# Patient Record
Sex: MALE | Race: BLACK OR AFRICAN AMERICAN | NOT HISPANIC OR LATINO | Employment: FULL TIME | URBAN - METROPOLITAN AREA
[De-identification: names, ages, dates, MRNs, and addresses within clinical notes are randomized per-mention and may not be internally consistent; named-entity substitution may affect disease eponyms.]

---

## 2023-01-30 ENCOUNTER — OFFICE VISIT (OUTPATIENT)
Dept: URGENT CARE | Facility: PHYSICIAN GROUP | Age: 38
End: 2023-01-30
Payer: COMMERCIAL

## 2023-01-30 DIAGNOSIS — R81 GLUCOSURIA: ICD-10-CM

## 2023-01-30 DIAGNOSIS — R35.0 URINARY FREQUENCY: ICD-10-CM

## 2023-01-30 DIAGNOSIS — R73.9 ELEVATED BLOOD SUGAR: ICD-10-CM

## 2023-01-30 DIAGNOSIS — E11.9 TYPE 2 DIABETES MELLITUS WITHOUT COMPLICATION, WITHOUT LONG-TERM CURRENT USE OF INSULIN (HCC): Primary | ICD-10-CM

## 2023-01-30 LAB
APPEARANCE UR: NORMAL
BILIRUB UR STRIP-MCNC: NORMAL MG/DL
COLOR UR AUTO: NORMAL
GLUCOSE BLD-MCNC: 424 MG/DL (ref 65–99)
GLUCOSE UR STRIP.AUTO-MCNC: 1000 MG/DL
HBA1C MFR BLD: 13.2 % (ref ?–5.8)
KETONES UR STRIP.AUTO-MCNC: 40 MG/DL
LEUKOCYTE ESTERASE UR QL STRIP.AUTO: NEGATIVE
NITRITE UR QL STRIP.AUTO: NEGATIVE
PH UR STRIP.AUTO: 5 [PH] (ref 5–8)
POCT INT CON NEG: NEGATIVE
POCT INT CON POS: POSITIVE
PROT UR QL STRIP: NEGATIVE MG/DL
RBC UR QL AUTO: NORMAL
SP GR UR STRIP.AUTO: 1.01
UROBILINOGEN UR STRIP-MCNC: 0.2 MG/DL

## 2023-01-30 PROCEDURE — 99204 OFFICE O/P NEW MOD 45 MIN: CPT | Performed by: NURSE PRACTITIONER

## 2023-01-30 PROCEDURE — 83036 HEMOGLOBIN GLYCOSYLATED A1C: CPT | Performed by: NURSE PRACTITIONER

## 2023-01-30 PROCEDURE — 82962 GLUCOSE BLOOD TEST: CPT | Performed by: NURSE PRACTITIONER

## 2023-01-30 PROCEDURE — 81002 URINALYSIS NONAUTO W/O SCOPE: CPT | Performed by: NURSE PRACTITIONER

## 2023-01-31 NOTE — PROGRESS NOTES
services were used for this visit.    Patient has consented to treatment and for use of patient information for treatment and billing purposes.    Date: 01/30/23     Arrival Mode: Private Vehicle    Chief Complaint:    Chief Complaint   Patient presents with    Urinary Frequency     Freq urination, x1 month         History of Present Illness: 37 y.o.  male presents to clinic with one month of increased urination. He denies any burning with urination, flank pain, or the possibility of sti/std. He denies nausea, and pain, vomiting or diarrhea. He denies numbness or tingling in his hands or feet, vision changes, lethargy, or shortness of breat. Denies shortness of breath chest pain or lower leg swelling.       ROS:    As stated in HPI     Pertinent Medical History:  History reviewed. No pertinent past medical history.     Pertinent Surgical History:  History reviewed. No pertinent surgical history.     Pertinent Medications:    No current outpatient medications on file prior to visit.     No current facility-administered medications on file prior to visit.        Allergies:    Patient has no known allergies.     Social History:  Social History     Tobacco Use    Smoking status: Never    Smokeless tobacco: Never        No LMP for male patient.           Physical Exam:    Vitals:    01/30/23 1642   BP: 130/82   Pulse: 90   Resp: 16   Temp: 37.2 °C (98.9 °F)   SpO2: 99%             Physical Exam  Constitutional:       General: He is awake.      Appearance: Normal appearance. He is not ill-appearing, toxic-appearing or diaphoretic.   HENT:      Head: Normocephalic and atraumatic.      Right Ear: Tympanic membrane, ear canal and external ear normal.      Left Ear: Tympanic membrane, ear canal and external ear normal.   Eyes:      General: Lids are normal. Gaze aligned appropriately. No allergic shiner or scleral icterus.     Extraocular Movements: Extraocular movements intact.      Conjunctiva/sclera:  Conjunctivae normal.      Pupils: Pupils are equal, round, and reactive to light.   Cardiovascular:      Rate and Rhythm: Normal rate and regular rhythm.      Pulses:           Radial pulses are 2+ on the right side and 2+ on the left side.      Heart sounds: Normal heart sounds.   Pulmonary:      Effort: Pulmonary effort is normal.      Breath sounds: Normal breath sounds and air entry. No decreased breath sounds, wheezing, rhonchi or rales.   Abdominal:      General: Abdomen is flat. Bowel sounds are normal.      Palpations: Abdomen is soft.      Tenderness: There is no abdominal tenderness. There is no right CVA tenderness or left CVA tenderness.   Musculoskeletal:      Right lower leg: No edema.      Left lower leg: No edema.   Lymphadenopathy:      Cervical: No cervical adenopathy.   Skin:     General: Skin is warm.      Capillary Refill: Capillary refill takes less than 2 seconds.      Coloration: Skin is not cyanotic or pale.   Neurological:      Mental Status: He is alert and oriented to person, place, and time.      Gait: Gait is intact.   Psychiatric:         Behavior: Behavior normal. Behavior is cooperative.          Diagnostics:      Results for orders placed or performed in visit on 01/30/23   POCT Urinalysis   Result Value Ref Range    POC Color light yellow Negative    POC Appearance cloudy Negative    POC Leukocyte Esterase negative Negative    POC Nitrites negative Negative    POC Urobiligen 0.2 Negative (0.2) mg/dL    POC Protein negative Negative mg/dL    POC Urine PH 5.0 5.0 - 8.0    POC Blood trace-intact Negative    POC Specific Gravity 1.010 <1.005 - >1.030    POC Ketones 40 Negative mg/dL    POC Bilirubin megative Negative mg/dL    POC Glucose 1,000 Negative mg/dL   POCT Glucose   Result Value Ref Range    Glucose - Accu-Ck 424 (A) 65 - 99 mg/dL   POCT  A1C   Result Value Ref Range    Glycohemoglobin 13.2 (A) 5.8 %    Internal Control Positive Positive     Internal Control Negative Negative         Diagnostics interpreted by myself.      Medical Decision making and clinic course :  I personally reviewed prior external notes and test results pertinent to today's visit.   Shared decision-making was utilized with patient for treatment plan. Urine has elevated glucose, fortunately, no ketones, hematuria or protein. Elevated poc finger stick and A1C. Findings consistent with DM type 2. Patient does not have a primary care provider.  Newly diagnosed diabetes mellitus type 2.  Low suspicion for diabetic ketoacidosis at this time as patient appears nontoxic on exam, he is well-hydrated, he is not tachycardic and appears generally well.  Will start patient on metformin 500 mg at night.  After 1 week patient will start taking metformin 500 mg 2 times daily.  We will place urgent referral for PCP as well as for diabetic education.  Did educate on diabetic diet as well as printed education for patient.    The patient remained stable during the urgent care visit.    Plan:    Medication discussed included indication for use and the potential  benefits and side effects.     1. Type 2 diabetes mellitus without complication, without long-term current use of insulin (HCC)    - metFORMIN (GLUCOPHAGE) 500 MG Tab; Take 1 Tablet by mouth every day for 7 days, THEN 1 Tablet 2 times a day with meals for 60 days.  Dispense: 127 Tablet; Refill: 0  - Referral to Diabetic Education    2. Urinary frequency    - POCT Urinalysis  - POCT Glucose  - Referral to establish with [facility] PCP    3. Glucosuria    - Referral to establish with [facility] PCP  - POCT  A1C  - Referral to Diabetic Education    4. Elevated blood sugar    - Referral to establish with [facility] PCP  - POCT  A1C  - Referral to Diabetic Education       All of the patient's questions were answered to their satisfaction at the time of discharge.    Follow up:    Recommended f/u with pcp as referred     Patient was encouraged to monitor symptoms closely. Those signs and symptoms  which would warrant concern and mandate seeking a higher level of service through the emergency department discussed at length and included in discharge papers.  Patient stated agreement and understanding of this plan of care.    Disposition:  Home in stable condition     Spent 45 min educating pt, reviewing lab results with patient and documenting.     Voice Recognition Disclaimer:  Portions of this document were created using voice recognition software. The software does have a chance of producing errors of grammar and possibly content. I have made every reasonable attempt to correct obvious errors, but there may be errors of grammar and possibly content that I did not discover before finalizing the documentation.    DALILA Jo.

## 2023-02-01 VITALS
TEMPERATURE: 98.9 F | OXYGEN SATURATION: 99 % | SYSTOLIC BLOOD PRESSURE: 130 MMHG | HEART RATE: 90 BPM | HEIGHT: 70 IN | DIASTOLIC BLOOD PRESSURE: 82 MMHG | RESPIRATION RATE: 16 BRPM | WEIGHT: 220 LBS | BODY MASS INDEX: 31.5 KG/M2

## 2023-02-08 ENCOUNTER — TELEPHONE (OUTPATIENT)
Dept: HEALTH INFORMATION MANAGEMENT | Facility: OTHER | Age: 38
End: 2023-02-08

## 2023-02-08 NOTE — TELEPHONE ENCOUNTER
OUTCOME:  CALLED PT TO SET UP APPT WITH A PCP. PT WILL CALL BACK AT A LATER TIME.     PLEASE TRANSFER TO [facility] [phone] WHEN PT RETURNS CALL.     ATTEMPT # 1

## 2024-01-04 ENCOUNTER — OFFICE VISIT (OUTPATIENT)
Dept: URGENT CARE | Facility: PHYSICIAN GROUP | Age: 39
End: 2024-01-04
Payer: COMMERCIAL

## 2024-01-04 VITALS
TEMPERATURE: 98.2 F | SYSTOLIC BLOOD PRESSURE: 126 MMHG | RESPIRATION RATE: 16 BRPM | WEIGHT: 199 LBS | DIASTOLIC BLOOD PRESSURE: 80 MMHG | HEIGHT: 70 IN | OXYGEN SATURATION: 98 % | HEART RATE: 95 BPM | BODY MASS INDEX: 28.49 KG/M2

## 2024-01-04 DIAGNOSIS — R35.0 URINARY FREQUENCY: ICD-10-CM

## 2024-01-04 DIAGNOSIS — E11.9 TYPE 2 DIABETES MELLITUS WITHOUT COMPLICATION, WITHOUT LONG-TERM CURRENT USE OF INSULIN (HCC): ICD-10-CM

## 2024-01-04 LAB — GLUCOSE BLD-MCNC: 320 MG/DL (ref 65–99)

## 2024-01-04 PROCEDURE — 3074F SYST BP LT 130 MM HG: CPT | Performed by: REGISTERED NURSE

## 2024-01-04 PROCEDURE — 82962 GLUCOSE BLOOD TEST: CPT | Performed by: REGISTERED NURSE

## 2024-01-04 PROCEDURE — 99213 OFFICE O/P EST LOW 20 MIN: CPT | Performed by: REGISTERED NURSE

## 2024-01-04 PROCEDURE — 3079F DIAST BP 80-89 MM HG: CPT | Performed by: REGISTERED NURSE

## 2024-01-04 ASSESSMENT — ENCOUNTER SYMPTOMS
SHORTNESS OF BREATH: 0
FLANK PAIN: 0
HEADACHES: 0
VOMITING: 0
DIZZINESS: 0
CHILLS: 0
FEVER: 0
ABDOMINAL PAIN: 0

## 2024-01-05 NOTE — PROGRESS NOTES
"Subjective:   Carlos A Pinon is a 38 y.o. male who presents for Medication Refill (Metformin refill) and Bladder Problem (Urinating frequently x 3 months )    HPI  History of type 2 diabetes diagnosed at a visit in January 2023 in urgent care.  Was prescribed metformin at that time.  Medication ran out 2 months ago so is not been taking it.  Since this medication ran out has had increased urinary frequency but no dysuria.  No polydipsia or polyphagia.  No back pain.  No fever chills body aches.    Review of Systems   Constitutional:  Negative for chills and fever.   Respiratory:  Negative for shortness of breath.    Cardiovascular:  Negative for chest pain.   Gastrointestinal:  Negative for abdominal pain and vomiting.   Genitourinary:  Positive for frequency. Negative for dysuria, flank pain, hematuria and urgency.   Skin:  Negative for rash.   Neurological:  Negative for dizziness and headaches.       Medications, Allergies, and current problem list reviewed today in Epic.     Objective:     /80 (BP Location: Left arm, Patient Position: Sitting, BP Cuff Size: Adult)   Pulse 95   Temp 36.8 °C (98.2 °F) (Temporal)   Resp 16   Ht 1.778 m (5' 10\")   Wt 90.3 kg (199 lb)   SpO2 98%     Physical Exam  Vitals and nursing note reviewed.   Constitutional:       Appearance: Normal appearance. He is not ill-appearing or toxic-appearing.   HENT:      Mouth/Throat:      Mouth: Mucous membranes are moist.   Eyes:      Pupils: Pupils are equal, round, and reactive to light.   Cardiovascular:      Rate and Rhythm: Normal rate and regular rhythm.      Heart sounds: No murmur heard.  Pulmonary:      Effort: Pulmonary effort is normal. No respiratory distress.      Breath sounds: Normal breath sounds.   Abdominal:      General: Abdomen is flat.      Palpations: Abdomen is soft.      Tenderness: There is no abdominal tenderness. There is no right CVA tenderness or left CVA tenderness.   Musculoskeletal:         General: " Normal range of motion.      Cervical back: Normal range of motion.   Skin:     General: Skin is warm and dry.      Capillary Refill: Capillary refill takes less than 2 seconds.      Findings: No rash.   Neurological:      General: No focal deficit present.      Mental Status: He is alert and oriented to person, place, and time.      Cranial Nerves: No cranial nerve deficit.      Sensory: No sensory deficit.      Gait: Gait normal.   Psychiatric:         Mood and Affect: Mood normal.         Lab Results/POC Test Results   Results for orders placed or performed in visit on 01/04/24   POCT Glucose   Result Value Ref Range    Glucose - Accu-Ck 320 (A) 65 - 99 mg/dL           Assessment/Plan:     Differential diagnosis discussed     1. Type 2 diabetes mellitus without complication, without long-term current use of insulin ([facility])  metFORMIN (GLUCOPHAGE) 500 MG Tab    POCT Glucose    HEMOGLOBIN A1C      2. Urinary frequency  metFORMIN (GLUCOPHAGE) 500 MG Tab    POCT Glucose    HEMOGLOBIN A1C        Diagnosed type 2 diabetes had a visit roughly 1 year ago.  Ran out of medication 2 months ago since then has increased urinary frequency.  No other symptoms.  In clinic POC glucose is 320.  Patient is neurologically intact and not diffuse.  Reviewed this with him we will refill his metformin discussed importance of monitoring blood sugar.  Referral placed for him to establish with primary care for better management of this condition.  Signs symptoms require immediate attention discussed with patient    Return to clinic or go to ED if symptoms worsen or persist. Indications for ED discussed at length. Red flag symptoms discussed. All side effects of medication discussed including allergic response, GI upset, tendon injury, rash, sedation etc. Patient and/or guardian voices understanding.     I personally reviewed prior external notes and test results pertinent to today's visit as well as additional imaging and testing completed  in clinic today.     Please note that this dictation was created using voice recognition software. I have made every reasonable attempt to correct obvious errors, but I expect that there are errors of grammar and possibly content that I did not discover before finalizing the note.    This note was electronically signed by EDOUARD Serna

## 2024-01-19 ENCOUNTER — TELEPHONE (OUTPATIENT)
Dept: HEALTH INFORMATION MANAGEMENT | Facility: OTHER | Age: 39
End: 2024-01-19

## 2024-07-11 ENCOUNTER — OFFICE VISIT (OUTPATIENT)
Dept: URGENT CARE | Facility: PHYSICIAN GROUP | Age: 39
End: 2024-07-11
Payer: COMMERCIAL

## 2024-07-11 VITALS
TEMPERATURE: 97.1 F | BODY MASS INDEX: 29.4 KG/M2 | OXYGEN SATURATION: 98 % | DIASTOLIC BLOOD PRESSURE: 68 MMHG | HEIGHT: 68 IN | HEART RATE: 98 BPM | WEIGHT: 194 LBS | SYSTOLIC BLOOD PRESSURE: 108 MMHG

## 2024-07-11 DIAGNOSIS — E11.9 TYPE 2 DIABETES MELLITUS WITHOUT COMPLICATION, WITHOUT LONG-TERM CURRENT USE OF INSULIN (HCC): ICD-10-CM

## 2024-07-11 PROCEDURE — 3074F SYST BP LT 130 MM HG: CPT | Performed by: NURSE PRACTITIONER

## 2024-07-11 PROCEDURE — 3078F DIAST BP <80 MM HG: CPT | Performed by: NURSE PRACTITIONER

## 2024-07-11 PROCEDURE — 99214 OFFICE O/P EST MOD 30 MIN: CPT | Performed by: NURSE PRACTITIONER

## 2024-07-11 ASSESSMENT — ENCOUNTER SYMPTOMS
CONSTITUTIONAL NEGATIVE: 1
RESPIRATORY NEGATIVE: 1
NEUROLOGICAL NEGATIVE: 1
CARDIOVASCULAR NEGATIVE: 1
BLURRED VISION: 0
POLYDIPSIA: 0

## 2024-07-11 ASSESSMENT — VISUAL ACUITY: OU: 1

## 2024-07-12 ENCOUNTER — HOSPITAL ENCOUNTER (OUTPATIENT)
Dept: LAB | Facility: MEDICAL CENTER | Age: 39
End: 2024-07-12
Attending: NURSE PRACTITIONER
Payer: COMMERCIAL

## 2024-07-12 DIAGNOSIS — E11.9 TYPE 2 DIABETES MELLITUS WITHOUT COMPLICATION, WITHOUT LONG-TERM CURRENT USE OF INSULIN (HCC): ICD-10-CM

## 2024-07-12 LAB
ALBUMIN SERPL BCP-MCNC: 4.1 G/DL (ref 3.2–4.9)
ALBUMIN/GLOB SERPL: 1.5 G/DL
ALP SERPL-CCNC: 82 U/L (ref 30–99)
ALT SERPL-CCNC: 12 U/L (ref 2–50)
ANION GAP SERPL CALC-SCNC: 10 MMOL/L (ref 7–16)
AST SERPL-CCNC: 8 U/L (ref 12–45)
BASOPHILS # BLD AUTO: 0.2 % (ref 0–1.8)
BASOPHILS # BLD: 0.01 K/UL (ref 0–0.12)
BILIRUB SERPL-MCNC: 0.7 MG/DL (ref 0.1–1.5)
BUN SERPL-MCNC: 14 MG/DL (ref 8–22)
CALCIUM ALBUM COR SERPL-MCNC: 8.7 MG/DL (ref 8.5–10.5)
CALCIUM SERPL-MCNC: 8.8 MG/DL (ref 8.5–10.5)
CHLORIDE SERPL-SCNC: 94 MMOL/L (ref 96–112)
CO2 SERPL-SCNC: 25 MMOL/L (ref 20–33)
CREAT SERPL-MCNC: 0.95 MG/DL (ref 0.5–1.4)
EOSINOPHIL # BLD AUTO: 0.01 K/UL (ref 0–0.51)
EOSINOPHIL NFR BLD: 0.2 % (ref 0–6.9)
ERYTHROCYTE [DISTWIDTH] IN BLOOD BY AUTOMATED COUNT: 36.9 FL (ref 35.9–50)
EST. AVERAGE GLUCOSE BLD GHB EST-MCNC: 367 MG/DL
GFR SERPLBLD CREATININE-BSD FMLA CKD-EPI: 103 ML/MIN/1.73 M 2
GLOBULIN SER CALC-MCNC: 2.8 G/DL (ref 1.9–3.5)
GLUCOSE SERPL-MCNC: 590 MG/DL (ref 65–99)
HBA1C MFR BLD: 14.4 % (ref 4–5.6)
HCT VFR BLD AUTO: 42.1 % (ref 42–52)
HGB BLD-MCNC: 14.7 G/DL (ref 14–18)
IMM GRANULOCYTES # BLD AUTO: 0.01 K/UL (ref 0–0.11)
IMM GRANULOCYTES NFR BLD AUTO: 0.2 % (ref 0–0.9)
LYMPHOCYTES # BLD AUTO: 1.88 K/UL (ref 1–4.8)
LYMPHOCYTES NFR BLD: 39.3 % (ref 22–41)
MCH RBC QN AUTO: 27.4 PG (ref 27–33)
MCHC RBC AUTO-ENTMCNC: 34.9 G/DL (ref 32.3–36.5)
MCV RBC AUTO: 78.4 FL (ref 81.4–97.8)
MONOCYTES # BLD AUTO: 0.36 K/UL (ref 0–0.85)
MONOCYTES NFR BLD AUTO: 7.5 % (ref 0–13.4)
NEUTROPHILS # BLD AUTO: 2.51 K/UL (ref 1.82–7.42)
NEUTROPHILS NFR BLD: 52.6 % (ref 44–72)
NRBC # BLD AUTO: 0 K/UL
NRBC BLD-RTO: 0 /100 WBC (ref 0–0.2)
PLATELET # BLD AUTO: 305 K/UL (ref 164–446)
PMV BLD AUTO: 10.1 FL (ref 9–12.9)
POTASSIUM SERPL-SCNC: 4.7 MMOL/L (ref 3.6–5.5)
PROT SERPL-MCNC: 6.9 G/DL (ref 6–8.2)
RBC # BLD AUTO: 5.37 M/UL (ref 4.7–6.1)
SODIUM SERPL-SCNC: 129 MMOL/L (ref 135–145)
WBC # BLD AUTO: 4.8 K/UL (ref 4.8–10.8)

## 2024-07-12 PROCEDURE — 36415 COLL VENOUS BLD VENIPUNCTURE: CPT

## 2024-07-12 PROCEDURE — 83036 HEMOGLOBIN GLYCOSYLATED A1C: CPT

## 2024-07-12 PROCEDURE — 80053 COMPREHEN METABOLIC PANEL: CPT

## 2024-07-12 PROCEDURE — 85025 COMPLETE CBC W/AUTO DIFF WBC: CPT

## 2024-12-04 ENCOUNTER — OFFICE VISIT (OUTPATIENT)
Dept: URGENT CARE | Facility: PHYSICIAN GROUP | Age: 39
End: 2024-12-04
Payer: COMMERCIAL

## 2024-12-04 VITALS
WEIGHT: 190.7 LBS | HEART RATE: 93 BPM | HEIGHT: 68 IN | OXYGEN SATURATION: 99 % | DIASTOLIC BLOOD PRESSURE: 82 MMHG | BODY MASS INDEX: 28.9 KG/M2 | TEMPERATURE: 96.9 F | RESPIRATION RATE: 15 BRPM | SYSTOLIC BLOOD PRESSURE: 118 MMHG

## 2024-12-04 DIAGNOSIS — E11.9 TYPE 2 DIABETES MELLITUS WITHOUT COMPLICATION, WITHOUT LONG-TERM CURRENT USE OF INSULIN (HCC): ICD-10-CM

## 2024-12-04 LAB — GLUCOSE BLD-MCNC: >600 MG/DL (ref 65–99)

## 2024-12-04 PROCEDURE — 82962 GLUCOSE BLOOD TEST: CPT | Performed by: FAMILY MEDICINE

## 2024-12-04 PROCEDURE — 99214 OFFICE O/P EST MOD 30 MIN: CPT | Performed by: FAMILY MEDICINE

## 2024-12-04 PROCEDURE — 3074F SYST BP LT 130 MM HG: CPT | Performed by: FAMILY MEDICINE

## 2024-12-04 PROCEDURE — 3079F DIAST BP 80-89 MM HG: CPT | Performed by: FAMILY MEDICINE

## 2024-12-04 ASSESSMENT — ENCOUNTER SYMPTOMS
EYE REDNESS: 0
EYE DISCHARGE: 0
MYALGIAS: 0
WEIGHT LOSS: 0
NAUSEA: 0
VOMITING: 0

## 2024-12-04 ASSESSMENT — FIBROSIS 4 INDEX: FIB4 SCORE: 0.31

## 2024-12-05 NOTE — PROGRESS NOTES
"Subjective     Carlos A Pinon is a 39 y.o. male who presents with Medication Refill (Metformin 1000mg)            Carlos A had multiple urgent care visits for poorly controlled type 2 diabetes.  He has been unable to follow-up with primary care due to his job however he notes this will be changing at the end of this month.  He has been out of metformin for a few weeks.  Currently experiencing polydipsia and polyuria.  No mental status change.  No other aggravating or alleviating factors.        Review of Systems   Constitutional:  Negative for malaise/fatigue and weight loss.   Eyes:  Negative for discharge and redness.   Gastrointestinal:  Negative for nausea and vomiting.   Musculoskeletal:  Negative for joint pain and myalgias.   Skin:  Negative for itching and rash.              Objective     /82 (BP Location: Right arm, Patient Position: Sitting, BP Cuff Size: Adult long)   Pulse 93   Temp 36.1 °C (96.9 °F) (Temporal)   Resp 15   Ht 1.735 m (5' 8.3\")   Wt 86.5 kg (190 lb 11.2 oz)   SpO2 99%   BMI 28.74 kg/m²      Physical Exam  Constitutional:       General: He is not in acute distress.     Appearance: He is well-developed.   HENT:      Head: Normocephalic and atraumatic.      Mouth/Throat:      Mouth: Mucous membranes are moist.   Eyes:      Conjunctiva/sclera: Conjunctivae normal.   Cardiovascular:      Rate and Rhythm: Normal rate and regular rhythm.      Heart sounds: Normal heart sounds. No murmur heard.  Pulmonary:      Effort: Pulmonary effort is normal.      Breath sounds: Normal breath sounds. No wheezing.   Skin:     General: Skin is warm and dry.      Findings: No rash.   Neurological:      Mental Status: He is alert.                             Assessment & Plan      POCT glucose >600    CBC, CMP, and hemoglobin A1c 7/12/2024 reviewed  Hemoglobin A1c 1/4/2024 reviewed    Urgent care visit 7/11/2024 and 1/4/2024 reviewed  Assessment & Plan  Type 2 diabetes mellitus without complication, " without long-term current use of insulin ([facility])    Orders:    POCT Glucose    Referral to establish with PCP    Referral to Pharmacotherapy Service    metformin (GLUCOPHAGE) 1000 MG tablet; Take 1 Tablet by mouth 2 times a day with meals for 90 days.    Carlos A looks remarkably well for such a high blood sugar.  No clinical evidence of mental status change due to hyperosmolar state.  Will refill his metformin and refer again to primary care and pharmacotherapy.

## 2025-01-10 ENCOUNTER — TELEPHONE (OUTPATIENT)
Dept: HEALTH INFORMATION MANAGEMENT | Facility: OTHER | Age: 40
End: 2025-01-10
Payer: COMMERCIAL

## 2025-04-13 ENCOUNTER — HOSPITAL ENCOUNTER (EMERGENCY)
Facility: MEDICAL CENTER | Age: 40
End: 2025-04-13
Attending: STUDENT IN AN ORGANIZED HEALTH CARE EDUCATION/TRAINING PROGRAM
Payer: COMMERCIAL

## 2025-04-13 VITALS
DIASTOLIC BLOOD PRESSURE: 95 MMHG | HEART RATE: 95 BPM | SYSTOLIC BLOOD PRESSURE: 124 MMHG | OXYGEN SATURATION: 100 % | TEMPERATURE: 96.6 F | BODY MASS INDEX: 28.2 KG/M2 | HEIGHT: 68 IN | RESPIRATION RATE: 16 BRPM | WEIGHT: 186.07 LBS

## 2025-04-13 DIAGNOSIS — E11.65 TYPE 2 DIABETES MELLITUS WITH HYPERGLYCEMIA, WITHOUT LONG-TERM CURRENT USE OF INSULIN (HCC): ICD-10-CM

## 2025-04-13 DIAGNOSIS — Z76.0 MEDICATION REFILL: ICD-10-CM

## 2025-04-13 DIAGNOSIS — R73.9 HYPERGLYCEMIA: Primary | ICD-10-CM

## 2025-04-13 LAB
ANION GAP SERPL CALC-SCNC: 13 MMOL/L (ref 7–16)
APPEARANCE UR: CLEAR
BASE EXCESS BLDV CALC-SCNC: 2 MMOL/L (ref -2–3)
BASOPHILS # BLD AUTO: 0.3 % (ref 0–1.8)
BASOPHILS # BLD: 0.02 K/UL (ref 0–0.12)
BILIRUB UR QL STRIP.AUTO: NEGATIVE
BODY TEMPERATURE: 37.1 CENTIGRADE
BUN SERPL-MCNC: 15 MG/DL (ref 8–22)
CALCIUM SERPL-MCNC: 9.8 MG/DL (ref 8.5–10.5)
CHLORIDE SERPL-SCNC: 89 MMOL/L (ref 96–112)
CO2 SERPL-SCNC: 25 MMOL/L (ref 20–33)
COLOR UR: YELLOW
CREAT SERPL-MCNC: 1.17 MG/DL (ref 0.5–1.4)
EOSINOPHIL # BLD AUTO: 0.02 K/UL (ref 0–0.51)
EOSINOPHIL NFR BLD: 0.3 % (ref 0–6.9)
ERYTHROCYTE [DISTWIDTH] IN BLOOD BY AUTOMATED COUNT: 34.9 FL (ref 35.9–50)
GFR SERPLBLD CREATININE-BSD FMLA CKD-EPI: 80 ML/MIN/1.73 M 2
GLUCOSE BLD STRIP.AUTO-MCNC: 366 MG/DL (ref 65–99)
GLUCOSE BLD STRIP.AUTO-MCNC: 373 MG/DL (ref 65–99)
GLUCOSE BLD STRIP.AUTO-MCNC: 407 MG/DL (ref 65–99)
GLUCOSE BLD STRIP.AUTO-MCNC: >600 MG/DL (ref 65–99)
GLUCOSE SERPL-MCNC: 600 MG/DL (ref 65–99)
GLUCOSE UR STRIP.AUTO-MCNC: >=1000 MG/DL
HCO3 BLDV-SCNC: 27 MMOL/L (ref 22–29)
HCT VFR BLD AUTO: 45.9 % (ref 42–52)
HGB BLD-MCNC: 16.5 G/DL (ref 14–18)
IMM GRANULOCYTES # BLD AUTO: 0.01 K/UL (ref 0–0.11)
IMM GRANULOCYTES NFR BLD AUTO: 0.2 % (ref 0–0.9)
KETONES UR STRIP.AUTO-MCNC: 15 MG/DL
LEUKOCYTE ESTERASE UR QL STRIP.AUTO: NEGATIVE
LYMPHOCYTES # BLD AUTO: 2.28 K/UL (ref 1–4.8)
LYMPHOCYTES NFR BLD: 36.9 % (ref 22–41)
MAGNESIUM SERPL-MCNC: 2.2 MG/DL (ref 1.5–2.5)
MCH RBC QN AUTO: 27.5 PG (ref 27–33)
MCHC RBC AUTO-ENTMCNC: 35.9 G/DL (ref 32.3–36.5)
MCV RBC AUTO: 76.4 FL (ref 81.4–97.8)
MICRO URNS: ABNORMAL
MONOCYTES # BLD AUTO: 0.42 K/UL (ref 0–0.85)
MONOCYTES NFR BLD AUTO: 6.8 % (ref 0–13.4)
NEUTROPHILS # BLD AUTO: 3.43 K/UL (ref 1.82–7.42)
NEUTROPHILS NFR BLD: 55.5 % (ref 44–72)
NITRITE UR QL STRIP.AUTO: NEGATIVE
NRBC # BLD AUTO: 0 K/UL
NRBC BLD-RTO: 0 /100 WBC (ref 0–0.2)
PCO2 BLDV: 48.2 MMHG (ref 38–54)
PCO2 TEMP ADJ BLDV: 48.2 MMHG
PH BLDV: 7.36 [PH] (ref 7.31–7.45)
PH TEMP ADJ BLDV: 7.36 [PH] (ref 7.31–7.45)
PH UR STRIP.AUTO: 6.5 [PH] (ref 5–8)
PLATELET # BLD AUTO: 365 K/UL (ref 164–446)
PMV BLD AUTO: 9.5 FL (ref 9–12.9)
PO2 BLDV: 38.2 MMHG (ref 23–48)
POTASSIUM SERPL-SCNC: 4.7 MMOL/L (ref 3.6–5.5)
PROT UR QL STRIP: NEGATIVE MG/DL
RBC # BLD AUTO: 6.01 M/UL (ref 4.7–6.1)
RBC UR QL AUTO: NEGATIVE
SAO2 % BLDV: 52 % (ref 60–85)
SODIUM SERPL-SCNC: 127 MMOL/L (ref 135–145)
SP GR UR STRIP.AUTO: >1.035
UROBILINOGEN UR STRIP.AUTO-MCNC: 0.2 EU/DL
WBC # BLD AUTO: 6.2 K/UL (ref 4.8–10.8)

## 2025-04-13 PROCEDURE — 96372 THER/PROPH/DIAG INJ SC/IM: CPT

## 2025-04-13 PROCEDURE — 81003 URINALYSIS AUTO W/O SCOPE: CPT

## 2025-04-13 PROCEDURE — 99284 EMERGENCY DEPT VISIT MOD MDM: CPT

## 2025-04-13 PROCEDURE — 700105 HCHG RX REV CODE 258: Performed by: STUDENT IN AN ORGANIZED HEALTH CARE EDUCATION/TRAINING PROGRAM

## 2025-04-13 PROCEDURE — 85025 COMPLETE CBC W/AUTO DIFF WBC: CPT

## 2025-04-13 PROCEDURE — 82803 BLOOD GASES ANY COMBINATION: CPT

## 2025-04-13 PROCEDURE — 82962 GLUCOSE BLOOD TEST: CPT

## 2025-04-13 PROCEDURE — 700102 HCHG RX REV CODE 250 W/ 637 OVERRIDE(OP): Performed by: STUDENT IN AN ORGANIZED HEALTH CARE EDUCATION/TRAINING PROGRAM

## 2025-04-13 PROCEDURE — 36415 COLL VENOUS BLD VENIPUNCTURE: CPT

## 2025-04-13 PROCEDURE — 80048 BASIC METABOLIC PNL TOTAL CA: CPT

## 2025-04-13 PROCEDURE — 83735 ASSAY OF MAGNESIUM: CPT

## 2025-04-13 RX ORDER — INSULIN LISPRO 100 [IU]/ML
5 INJECTION, SOLUTION INTRAVENOUS; SUBCUTANEOUS ONCE
Status: COMPLETED | OUTPATIENT
Start: 2025-04-13 | End: 2025-04-13

## 2025-04-13 RX ORDER — INSULIN LISPRO 100 [IU]/ML
8 INJECTION, SOLUTION INTRAVENOUS; SUBCUTANEOUS ONCE
Status: DISCONTINUED | OUTPATIENT
Start: 2025-04-13 | End: 2025-04-13

## 2025-04-13 RX ORDER — SODIUM CHLORIDE, SODIUM LACTATE, POTASSIUM CHLORIDE, AND CALCIUM CHLORIDE .6; .31; .03; .02 G/100ML; G/100ML; G/100ML; G/100ML
1000 INJECTION, SOLUTION INTRAVENOUS ONCE
Status: COMPLETED | OUTPATIENT
Start: 2025-04-13 | End: 2025-04-13

## 2025-04-13 RX ORDER — SODIUM CHLORIDE, SODIUM LACTATE, POTASSIUM CHLORIDE, CALCIUM CHLORIDE 600; 310; 30; 20 MG/100ML; MG/100ML; MG/100ML; MG/100ML
1000 INJECTION, SOLUTION INTRAVENOUS ONCE
Status: COMPLETED | OUTPATIENT
Start: 2025-04-13 | End: 2025-04-13

## 2025-04-13 RX ADMIN — SODIUM CHLORIDE, POTASSIUM CHLORIDE, SODIUM LACTATE AND CALCIUM CHLORIDE 1000 ML: 600; 310; 30; 20 INJECTION, SOLUTION INTRAVENOUS at 17:30

## 2025-04-13 RX ADMIN — SODIUM CHLORIDE, POTASSIUM CHLORIDE, SODIUM LACTATE AND CALCIUM CHLORIDE 1000 ML: 600; 310; 30; 20 INJECTION, SOLUTION INTRAVENOUS at 16:30

## 2025-04-13 RX ADMIN — INSULIN LISPRO 5 UNITS: 100 INJECTION, SOLUTION INTRAVENOUS; SUBCUTANEOUS at 18:27

## 2025-04-13 ASSESSMENT — FIBROSIS 4 INDEX: FIB4 SCORE: 0.31

## 2025-04-13 NOTE — ED NOTES
Medication history reviewed with pt. Med rec is complete.  Allergies reviewed, per pt    Pt reports no prescription medications, OTC's, or vitamins in the last 30 days or longer.    Patient has not had any outpatient antibiotics in the last 30 days.    Pt is not on any anticoagulants

## 2025-04-13 NOTE — ED PROVIDER NOTES
"ED Provider Note    CHIEF COMPLAINT  Chief Complaint   Patient presents with    High Blood Sugar     Read HI on the POCT glucose in triage. Patient states he has not taken his medication for around 20 days. Patient would like help controlling this.        EXTERNAL RECORDS REVIEWED  Outpatient Notes patient was diagnosed with diabetes in January 2023 at urgent care.  He was started on metformin.  He has been seen at urgent care 3 times since then for medication refill.  He does not have a primary care doctor    HPI/ROS  LIMITATION TO HISTORY   Select: : None  OUTSIDE HISTORIAN(S):  None    Carlos A Pinon is a 39 y.o. male who presents who states that he needs refill for metformin to 1000 mg twice daily.  Patient states that he has history of type 2 diabetes.  He does not regularly check his blood sugar at home.  He has not taken his metformin in the past 20 days as he is run out.  He denies any nausea, vomiting, chest pain, shortness of breath.  He does not have a primary care doctor.  On arrival, blood sugar was checked and it was over 600 in triage.    PAST MEDICAL HISTORY   He has history of diabetes    SURGICAL HISTORY  patient denies any surgical history    FAMILY HISTORY  No family history on file.    SOCIAL HISTORY  Social History     Tobacco Use    Smoking status: Never    Smokeless tobacco: Never   Vaping Use    Vaping status: Never Used   Substance and Sexual Activity    Alcohol use: Not on file    Drug use: Not on file    Sexual activity: Not on file       CURRENT MEDICATIONS  Home Medications       Reviewed by Christo Moran R.N. (Registered Nurse) on 04/13/25 at 1530  Med List Status: Not Addressed     Medication Last Dose Status        Patient Baljeet Taking any Medications                           ALLERGIES  No Known Allergies    PHYSICAL EXAM  VITAL SIGNS: BP (!) 144/101   Pulse 99   Temp 35.9 °C (96.6 °F) (Temporal)   Resp 18   Ht 1.727 m (5' 8\")   Wt 84.4 kg (186 lb 1.1 oz)   SpO2 93%   BMI " 28.29 kg/m²      Constitutional: Well developed, Well nourished, No acute respiratory distress, Non-toxic appearance.   HENT: Normocephalic, Atraumatic  Cardiovascular: Normal heart rate, Normal rhythm, No murmurs, No rubs, No gallops.   Thorax & Lungs: Clear to auscultation bilaterally, No respiratory distress, No wheezing.  Abdomen: Soft nontender  no rebound masses or peritoneal signs  Skin: Warm, Dry, No erythema, No rash.   Extremities: Intact distal pulses, No edema, No tenderness  Neurologic: Alert & oriented. No focal deficits noted.   Psych: Alert normal affect       EKG/LABS  Results for orders placed or performed during the hospital encounter of 04/13/25   POCT glucose device results    Collection Time: 04/13/25  3:27 PM   Result Value Ref Range    POC Glucose, Blood >600 (HH) 65 - 99 mg/dL   VENOUS BLOOD GAS    Collection Time: 04/13/25  4:10 PM   Result Value Ref Range    Venous Bg Ph 7.36 7.31 - 7.45    Venous Bg Ph Temp Corrected 7.36 7.31 - 7.45    Venous Bg Pco2 48.2 38.0 - 54.0 mmHg    Venous Bg Pco2 Temp Corrected 48.2 mmHg    Venous Bg Po2 38.2 23.0 - 48.0 mmHg    Venous Bg O2 Saturation 52.0 (L) 60.0 - 85.0 %    Venous Bg Hco3 27 22 - 29 mmol/L    Venous Bg Base Excess 2 -2 - 3 mmol/L    Body Temp 37.1 Centigrade   CBC WITH DIFFERENTIAL    Collection Time: 04/13/25  4:16 PM   Result Value Ref Range    WBC 6.2 4.8 - 10.8 K/uL    RBC 6.01 4.70 - 6.10 M/uL    Hemoglobin 16.5 14.0 - 18.0 g/dL    Hematocrit 45.9 42.0 - 52.0 %    MCV 76.4 (L) 81.4 - 97.8 fL    MCH 27.5 27.0 - 33.0 pg    MCHC 35.9 32.3 - 36.5 g/dL    RDW 34.9 (L) 35.9 - 50.0 fL    Platelet Count 365 164 - 446 K/uL    MPV 9.5 9.0 - 12.9 fL    Neutrophils-Polys 55.50 44.00 - 72.00 %    Lymphocytes 36.90 22.00 - 41.00 %    Monocytes 6.80 0.00 - 13.40 %    Eosinophils 0.30 0.00 - 6.90 %    Basophils 0.30 0.00 - 1.80 %    Immature Granulocytes 0.20 0.00 - 0.90 %    Nucleated RBC 0.00 0.00 - 0.20 /100 WBC    Neutrophils (Absolute) 3.43 1.82  - 7.42 K/uL    Lymphs (Absolute) 2.28 1.00 - 4.80 K/uL    Monos (Absolute) 0.42 0.00 - 0.85 K/uL    Eos (Absolute) 0.02 0.00 - 0.51 K/uL    Baso (Absolute) 0.02 0.00 - 0.12 K/uL    Immature Granulocytes (abs) 0.01 0.00 - 0.11 K/uL    NRBC (Absolute) 0.00 K/uL   BASIC METABOLIC PANEL    Collection Time: 04/13/25  4:16 PM   Result Value Ref Range    Sodium 127 (L) 135 - 145 mmol/L    Potassium 4.7 3.6 - 5.5 mmol/L    Chloride 89 (L) 96 - 112 mmol/L    Co2 25 20 - 33 mmol/L    Glucose 600 (HH) 65 - 99 mg/dL    Bun 15 8 - 22 mg/dL    Creatinine 1.17 0.50 - 1.40 mg/dL    Calcium 9.8 8.5 - 10.5 mg/dL    Anion Gap 13.0 7.0 - 16.0   MAGNESIUM    Collection Time: 04/13/25  4:16 PM   Result Value Ref Range    Magnesium 2.2 1.5 - 2.5 mg/dL   ESTIMATED GFR    Collection Time: 04/13/25  4:16 PM   Result Value Ref Range    GFR (CKD-EPI) 80 >60 mL/min/1.73 m 2   POCT glucose device results    Collection Time: 04/13/25  5:41 PM   Result Value Ref Range    POC Glucose, Blood 407 (H) 65 - 99 mg/dL   URINALYSIS,CULTURE IF INDICATED    Collection Time: 04/13/25  5:45 PM    Specimen: Urine, Clean Catch   Result Value Ref Range    Color Yellow     Character Clear     Specific Gravity >1.035 (A) <1.035    Ph 6.5 5.0 - 8.0    Glucose >=1000 (A) Negative mg/dL    Ketones 15 (A) Negative mg/dL    Protein Negative Negative mg/dL    Bilirubin Negative Negative    Urobilinogen, Urine 0.2 <=1.0 EU/dL    Nitrite Negative Negative    Leukocyte Esterase Negative Negative    Occult Blood Negative Negative    Micro Urine Req see below    POCT glucose device results    Collection Time: 04/13/25  6:26 PM   Result Value Ref Range    POC Glucose, Blood 366 (H) 65 - 99 mg/dL   POCT glucose device results    Collection Time: 04/13/25  7:18 PM   Result Value Ref Range    POC Glucose, Blood 373 (H) 65 - 99 mg/dL     I have independently interpreted this EKG    COURSE & MEDICAL DECISION MAKING    ASSESSMENT, COURSE AND PLAN  Care Narrative:   This is a  39-year-old male with history of type 2 diabetes who is presenting for evaluation of medication refill.  He is on metformin to 1000 mg twice daily for type 2 diabetes.  He has been diagnosed with diabetes by urgent care.  He has not established primary care.  He has just been going to urgent care for refills of his metformin.  He presents to the ER today as he ran out of his metformin approximately 3 weeks ago.  He has no acute medical complaints.  He has no nausea or vomiting.  He has no chest pain.  On arrival, his vital signs are stable.  Blood sugar was checked in triage and is over 600.  Labs are obtained once he was roomed.  There is no leukocytosis.  He does have hyperglycemia with glucose of 600 however there is no acidosis or evidence of DKA.  His sodium is low at 127 but I suspect that this is pseudohyponatremia secondary to his hyperglycemia.  Patient was given 2 L of IV fluids.  He was also given 5 units of subcutaneous insulin.  Blood sugar did decrease to approximately 360 after this.    I discussed with the patient that it is of utmost importance that he follow-up with primary care doctor as I suspect that he will need to be on additional agents to control his blood sugar in addition to metformin.  Discussed with him that urgent care and emergency room cannot provide adequate diabetes management without a primary care doctor could.  Discussed with him that he is at risk for chronic kidney disease, retinal disease, stroke, heart disease etc. secondary to his uncontrolled sugars.  I have referred him to primary care doctor, I have asked for an ER  to call him to schedule follow-up.  I also gave him names of local clinics for him to contact to schedule with.  Patient voices understanding.  I have refilled his metformin.  He is advised to return for any new or worsening symptoms.  He is agreeable to discharge plan with no further questions.    Hydration: Based on the patient's presentation of  Hyperglycemia the patient was given IV fluids. IV Hydration was used because oral hydration was not adequate alone. Upon recheck following hydration, the patient was improved blood glucose.          ADDITIONAL PROBLEMS MANAGED  None    DISPOSITION AND DISCUSSIONS  I have discussed management of the patient with the following physicians and RAFAL's:    None    Discussion of management with other QHP or appropriate source(s): None     Escalation of care considered, and ultimately not performed:acute inpatient care management, however at this time, the patient is most appropriate for outpatient management    Barriers to care at this time, including but not limited to: Patient does not have established PCP.     Decision tools and prescription drugs considered including, but not limited to:  None .     The patient will return for new or worsening symptoms and is stable at the time of discharge.    The patient is referred to a primary physician for blood pressure management, diabetic screening, and for all other preventative health concerns.      DISPOSITION:  Patient will be discharged home in stable condition.    FOLLOW UP:  [place] [address] [facility] [address]  [phone]        [facility]  [address] [facility] [place] [address]  [phone]        [facility], Emergency Dept  [address]  [facility] [address]  [phone]          OUTPATIENT MEDICATIONS:  Discharge Medication List as of 4/13/2025  7:44 PM        START taking these medications    Details   metformin (GLUCOPHAGE) 1000 MG tablet Take 1 Tablet by mouth 2 times a day with meals for 30 days., Disp-60 Tablet, R-0, Normal               FINAL DIAGNOSIS  1. Hyperglycemia Acute   2. Type 2 diabetes mellitus with hyperglycemia, without long-term current use of insulin (HCC) Acute   3. Medication refill Acute        Electronically signed by: Muriel Benton M.D., 4/13/2025 3:57 PM

## 2025-04-13 NOTE — ED TRIAGE NOTES
"Patient presents to the ER with the following complaints:    Chief Complaint   Patient presents with    High Blood Sugar     Read HI on the POCT glucose in triage. Patient states he has not taken his medication for around 20 days. Patient would like help controlling this.        BP (!) 144/101   Pulse 99   Temp 35.9 °C (96.6 °F) (Temporal)   Resp 18   Ht 1.727 m (5' 8\")   Wt 84.4 kg (186 lb 1.1 oz)   SpO2 93%   BMI 28.29 kg/m²       "

## 2025-04-14 NOTE — DISCHARGE INSTRUCTIONS
You were seen for evaluation of elevated blood glucose.  I did refill your metformin however I do think that you will need to be started on another agent to better control your blood sugars.  I referred you to a primary care doctor as you will need to follow-up with 1 to have this done.  Please return for any vomiting, chest pain or any other concerns.

## 2025-05-15 ENCOUNTER — APPOINTMENT (OUTPATIENT)
Dept: MEDICAL GROUP | Facility: MEDICAL CENTER | Age: 40
End: 2025-05-15
Payer: COMMERCIAL

## 2025-05-15 DIAGNOSIS — E11.65 TYPE 2 DIABETES MELLITUS WITH HYPERGLYCEMIA, WITHOUT LONG-TERM CURRENT USE OF INSULIN (HCC): Primary | ICD-10-CM

## 2025-05-15 NOTE — ASSESSMENT & PLAN NOTE
Chronic, uncontrolled.      Lab Results   Component Value Date/Time    HBA1C 14.4 (H) 07/12/2024 08:27 AM        - Hemoglobin A1c is over 7.0%.     - POC A1c today was:   - He is not up-to-date with all diabetic screenings, not on an ACE inhibitor for renal protection, and is not on a statin for cardiovascular protection.     - Medication Management Today:

## 2025-05-15 NOTE — PATIENT INSTRUCTIONS
"What is a low-carbohydrate diet?     A low-carbohydrate, or \"low-carb,\" diet involves eating foods that are high in protein and moderate in healthy fats. Most low-carb diets limit foods that are high in added sugar or carbs. These are foods like sweets, starches, and refined grains.    For some people, following a low-carb diet might help them:  ?Lose weight  ?Manage their blood sugar    With a low-carb diet, many people try to eat between 60 and 130 grams of carbs per day. For foods with labels, reading the label can tell you how many grams of carbs are in a serving.    What can I eat and drink on a low-carb diet?     While eating a low-carb diet, it's important to make sure you are getting enough of the nutrients your body needs. Some kinds of proteins and fats are better for your health than others. The same is true for foods with carbs.    Meat and fish have very few carbs in them. Fats also have very few carbs in them. However, it is important to make healthy choices for fats and proteins:  ?Choose \"healthy\" fats - These are also called \"monounsaturated\" or \"polyunsaturated\" fats. They tend to be more liquid at room temperature. Healthy fats are found in things like olive oil, canola oil, and sesame oil. They are also found in nuts, seeds, avocados, and nut butters.  ?Choose lean meats, poultry, seafood, and proteins - Eat fish, chicken, pork, and beef. Eggs, seeds, nuts and nut butters, and cheese are also healthy choices.    Eat non-starchy, colorful vegetables like lettuce, spinach, broccoli, cauliflower, zucchini, and cabbage. Fruits like watermelon, peaches, and berries are also lower in carbs. Choose whole grains because they provide fiber.    What foods and drinks should I avoid on a low-carb diet?     Avoid or limit foods that are high in added sugar. Many alcoholic drinks also contain carbs and should be limited.  ?Grains to limit or avoid - Grains made with refined flour. Examples include breads, " chips, cookies, cakes, candy, doughnuts, granola, baked goods, muffins, pizza dough, and pie crusts that are packaged.  ?Fruits to limit or avoid - Fruits high in carbs. Examples include dried fruits, fruit juices, and processed fruit products.  ?Vegetables to limit or avoid - Starchy vegetables. Examples include white potatoes, corn, and turnips.

## 2025-07-08 ENCOUNTER — APPOINTMENT (OUTPATIENT)
Dept: MEDICAL GROUP | Facility: MEDICAL CENTER | Age: 40
End: 2025-07-08
Payer: COMMERCIAL

## 2025-09-24 ENCOUNTER — APPOINTMENT (OUTPATIENT)
Dept: MEDICAL GROUP | Facility: MEDICAL CENTER | Age: 40
End: 2025-09-24
Attending: STUDENT IN AN ORGANIZED HEALTH CARE EDUCATION/TRAINING PROGRAM
Payer: COMMERCIAL